# Patient Record
Sex: MALE | Race: WHITE | Employment: FULL TIME | URBAN - METROPOLITAN AREA
[De-identification: names, ages, dates, MRNs, and addresses within clinical notes are randomized per-mention and may not be internally consistent; named-entity substitution may affect disease eponyms.]

---

## 2020-09-17 ENCOUNTER — HOSPITAL ENCOUNTER (OUTPATIENT)
Dept: PREADMISSION TESTING | Age: 55
Discharge: HOME | End: 2020-09-17
Payer: MEDICARE

## 2020-09-17 PROCEDURE — 87635 SARS-COV-2 COVID-19 AMP PRB: CPT

## 2020-09-18 LAB — SARS-COV-2, COV2NT: NOT DETECTED

## 2020-09-21 ENCOUNTER — ANESTHESIA EVENT (OUTPATIENT)
Dept: SURGERY | Age: 55
End: 2020-09-21
Payer: MEDICARE

## 2020-09-22 ENCOUNTER — ANESTHESIA (OUTPATIENT)
Dept: SURGERY | Age: 55
End: 2020-09-22
Payer: MEDICARE

## 2020-09-22 ENCOUNTER — HOSPITAL ENCOUNTER (OUTPATIENT)
Age: 55
Setting detail: OUTPATIENT SURGERY
Discharge: HOME | End: 2020-09-22
Attending: UROLOGY | Admitting: UROLOGY
Payer: MEDICARE

## 2020-09-22 ENCOUNTER — APPOINTMENT (OUTPATIENT)
Dept: GENERAL RADIOLOGY | Age: 55
End: 2020-09-22
Attending: UROLOGY
Payer: MEDICARE

## 2020-09-22 VITALS
RESPIRATION RATE: 12 BRPM | DIASTOLIC BLOOD PRESSURE: 84 MMHG | HEART RATE: 68 BPM | WEIGHT: 186.13 LBS | SYSTOLIC BLOOD PRESSURE: 142 MMHG | TEMPERATURE: 96.6 F | HEIGHT: 76 IN | BODY MASS INDEX: 22.67 KG/M2 | OXYGEN SATURATION: 95 %

## 2020-09-22 DIAGNOSIS — N20.0 CALCULUS OF KIDNEY: Primary | ICD-10-CM

## 2020-09-22 PROCEDURE — 74011250636 HC RX REV CODE- 250/636: Performed by: UROLOGY

## 2020-09-22 PROCEDURE — C1758 CATHETER, URETERAL: HCPCS | Performed by: UROLOGY

## 2020-09-22 PROCEDURE — 77030010103 HC SEAL PRT ENDOSC OCOA -B: Performed by: UROLOGY

## 2020-09-22 PROCEDURE — 74011000250 HC RX REV CODE- 250: Performed by: NURSE ANESTHETIST, CERTIFIED REGISTERED

## 2020-09-22 PROCEDURE — 77030020782 HC GWN BAIR PAWS FLX 3M -B: Performed by: UROLOGY

## 2020-09-22 PROCEDURE — 74011250636 HC RX REV CODE- 250/636: Performed by: NURSE ANESTHETIST, CERTIFIED REGISTERED

## 2020-09-22 PROCEDURE — 77030040361 HC SLV COMPR DVT MDII -B: Performed by: UROLOGY

## 2020-09-22 PROCEDURE — 74420 UROGRAPHY RTRGR +-KUB: CPT

## 2020-09-22 PROCEDURE — 74011250636 HC RX REV CODE- 250/636: Performed by: ANESTHESIOLOGY

## 2020-09-22 PROCEDURE — 76060000032 HC ANESTHESIA 0.5 TO 1 HR: Performed by: UROLOGY

## 2020-09-22 PROCEDURE — 76210000006 HC OR PH I REC 0.5 TO 1 HR: Performed by: UROLOGY

## 2020-09-22 PROCEDURE — C1769 GUIDE WIRE: HCPCS | Performed by: UROLOGY

## 2020-09-22 PROCEDURE — C2617 STENT, NON-COR, TEM W/O DEL: HCPCS | Performed by: UROLOGY

## 2020-09-22 PROCEDURE — 2709999900 HC NON-CHARGEABLE SUPPLY: Performed by: UROLOGY

## 2020-09-22 PROCEDURE — 77030012508 HC MSK AIRWY LMA AMBU -A: Performed by: NURSE ANESTHETIST, CERTIFIED REGISTERED

## 2020-09-22 PROCEDURE — 76010000138 HC OR TIME 0.5 TO 1 HR: Performed by: UROLOGY

## 2020-09-22 PROCEDURE — 76210000021 HC REC RM PH II 0.5 TO 1 HR: Performed by: UROLOGY

## 2020-09-22 DEVICE — URETERAL STENT
Type: IMPLANTABLE DEVICE | Site: URETER | Status: FUNCTIONAL
Brand: POLARIS™ ULTRA

## 2020-09-22 RX ORDER — ONDANSETRON 2 MG/ML
INJECTION INTRAMUSCULAR; INTRAVENOUS AS NEEDED
Status: DISCONTINUED | OUTPATIENT
Start: 2020-09-22 | End: 2020-09-22 | Stop reason: HOSPADM

## 2020-09-22 RX ORDER — HYDROMORPHONE HYDROCHLORIDE 2 MG/ML
0.2 INJECTION, SOLUTION INTRAMUSCULAR; INTRAVENOUS; SUBCUTANEOUS AS NEEDED
Status: DISCONTINUED | OUTPATIENT
Start: 2020-09-22 | End: 2020-09-22 | Stop reason: HOSPADM

## 2020-09-22 RX ORDER — SODIUM CHLORIDE, SODIUM LACTATE, POTASSIUM CHLORIDE, CALCIUM CHLORIDE 600; 310; 30; 20 MG/100ML; MG/100ML; MG/100ML; MG/100ML
125 INJECTION, SOLUTION INTRAVENOUS CONTINUOUS
Status: DISCONTINUED | OUTPATIENT
Start: 2020-09-22 | End: 2020-09-22 | Stop reason: HOSPADM

## 2020-09-22 RX ORDER — LEVOFLOXACIN 5 MG/ML
500 INJECTION, SOLUTION INTRAVENOUS ONCE
Status: COMPLETED | OUTPATIENT
Start: 2020-09-22 | End: 2020-09-22

## 2020-09-22 RX ORDER — KETAMINE HYDROCHLORIDE 10 MG/ML
INJECTION, SOLUTION INTRAMUSCULAR; INTRAVENOUS AS NEEDED
Status: DISCONTINUED | OUTPATIENT
Start: 2020-09-22 | End: 2020-09-22 | Stop reason: HOSPADM

## 2020-09-22 RX ORDER — SODIUM CHLORIDE, SODIUM LACTATE, POTASSIUM CHLORIDE, CALCIUM CHLORIDE 600; 310; 30; 20 MG/100ML; MG/100ML; MG/100ML; MG/100ML
25 INJECTION, SOLUTION INTRAVENOUS CONTINUOUS
Status: DISCONTINUED | OUTPATIENT
Start: 2020-09-22 | End: 2020-09-22 | Stop reason: HOSPADM

## 2020-09-22 RX ORDER — SULFAMETHOXAZOLE AND TRIMETHOPRIM 800; 160 MG/1; MG/1
1 TABLET ORAL 2 TIMES DAILY
Qty: 10 TAB | Refills: 0 | Status: SHIPPED | OUTPATIENT
Start: 2020-09-23 | End: 2020-09-28

## 2020-09-22 RX ORDER — ONDANSETRON 2 MG/ML
4 INJECTION INTRAMUSCULAR; INTRAVENOUS ONCE
Status: DISCONTINUED | OUTPATIENT
Start: 2020-09-22 | End: 2020-09-22 | Stop reason: HOSPADM

## 2020-09-22 RX ORDER — HYDROMORPHONE HYDROCHLORIDE 2 MG/ML
0.5 INJECTION, SOLUTION INTRAMUSCULAR; INTRAVENOUS; SUBCUTANEOUS
Status: DISCONTINUED | OUTPATIENT
Start: 2020-09-22 | End: 2020-09-22 | Stop reason: HOSPADM

## 2020-09-22 RX ORDER — DIPHENHYDRAMINE HYDROCHLORIDE 50 MG/ML
12.5 INJECTION, SOLUTION INTRAMUSCULAR; INTRAVENOUS
Status: DISCONTINUED | OUTPATIENT
Start: 2020-09-22 | End: 2020-09-22 | Stop reason: HOSPADM

## 2020-09-22 RX ORDER — ALBUTEROL SULFATE 0.83 MG/ML
2.5 SOLUTION RESPIRATORY (INHALATION)
Status: DISCONTINUED | OUTPATIENT
Start: 2020-09-22 | End: 2020-09-22 | Stop reason: HOSPADM

## 2020-09-22 RX ORDER — FLUMAZENIL 0.1 MG/ML
0.2 INJECTION INTRAVENOUS
Status: DISCONTINUED | OUTPATIENT
Start: 2020-09-22 | End: 2020-09-22 | Stop reason: HOSPADM

## 2020-09-22 RX ORDER — HYDROCODONE BITARTRATE AND ACETAMINOPHEN 5; 325 MG/1; MG/1
1 TABLET ORAL AS NEEDED
Status: DISCONTINUED | OUTPATIENT
Start: 2020-09-22 | End: 2020-09-22 | Stop reason: HOSPADM

## 2020-09-22 RX ORDER — LIDOCAINE HYDROCHLORIDE 20 MG/ML
INJECTION, SOLUTION EPIDURAL; INFILTRATION; INTRACAUDAL; PERINEURAL AS NEEDED
Status: DISCONTINUED | OUTPATIENT
Start: 2020-09-22 | End: 2020-09-22 | Stop reason: HOSPADM

## 2020-09-22 RX ORDER — OXYCODONE AND ACETAMINOPHEN 5; 325 MG/1; MG/1
1 TABLET ORAL
Qty: 28 TAB | Refills: 0 | Status: SHIPPED | OUTPATIENT
Start: 2020-09-22 | End: 2020-09-29

## 2020-09-22 RX ORDER — PROPOFOL 10 MG/ML
INJECTION, EMULSION INTRAVENOUS AS NEEDED
Status: DISCONTINUED | OUTPATIENT
Start: 2020-09-22 | End: 2020-09-22 | Stop reason: HOSPADM

## 2020-09-22 RX ORDER — HYDROMORPHONE HYDROCHLORIDE 2 MG/ML
INJECTION, SOLUTION INTRAMUSCULAR; INTRAVENOUS; SUBCUTANEOUS AS NEEDED
Status: DISCONTINUED | OUTPATIENT
Start: 2020-09-22 | End: 2020-09-22

## 2020-09-22 RX ORDER — DEXAMETHASONE SODIUM PHOSPHATE 4 MG/ML
INJECTION, SOLUTION INTRA-ARTICULAR; INTRALESIONAL; INTRAMUSCULAR; INTRAVENOUS; SOFT TISSUE AS NEEDED
Status: DISCONTINUED | OUTPATIENT
Start: 2020-09-22 | End: 2020-09-22 | Stop reason: HOSPADM

## 2020-09-22 RX ORDER — MIDAZOLAM HYDROCHLORIDE 1 MG/ML
INJECTION, SOLUTION INTRAMUSCULAR; INTRAVENOUS AS NEEDED
Status: DISCONTINUED | OUTPATIENT
Start: 2020-09-22 | End: 2020-09-22 | Stop reason: HOSPADM

## 2020-09-22 RX ORDER — HYDROMORPHONE HYDROCHLORIDE 2 MG/ML
INJECTION, SOLUTION INTRAMUSCULAR; INTRAVENOUS; SUBCUTANEOUS AS NEEDED
Status: DISCONTINUED | OUTPATIENT
Start: 2020-09-22 | End: 2020-09-22 | Stop reason: HOSPADM

## 2020-09-22 RX ADMIN — SODIUM CHLORIDE, SODIUM LACTATE, POTASSIUM CHLORIDE, AND CALCIUM CHLORIDE: 600; 310; 30; 20 INJECTION, SOLUTION INTRAVENOUS at 13:32

## 2020-09-22 RX ADMIN — KETAMINE HYDROCHLORIDE 10 MG: 10 INJECTION, SOLUTION INTRAMUSCULAR; INTRAVENOUS at 13:08

## 2020-09-22 RX ADMIN — DEXAMETHASONE SODIUM PHOSPHATE 4 MG: 4 INJECTION, SOLUTION INTRAMUSCULAR; INTRAVENOUS at 13:02

## 2020-09-22 RX ADMIN — HYDROMORPHONE HYDROCHLORIDE 0.5 MG: 2 INJECTION, SOLUTION INTRAMUSCULAR; INTRAVENOUS; SUBCUTANEOUS at 13:18

## 2020-09-22 RX ADMIN — KETAMINE HYDROCHLORIDE 20 MG: 10 INJECTION, SOLUTION INTRAMUSCULAR; INTRAVENOUS at 12:55

## 2020-09-22 RX ADMIN — PROPOFOL 200 MG: 10 INJECTION, EMULSION INTRAVENOUS at 12:55

## 2020-09-22 RX ADMIN — SODIUM CHLORIDE, SODIUM LACTATE, POTASSIUM CHLORIDE, AND CALCIUM CHLORIDE 125 ML/HR: 600; 310; 30; 20 INJECTION, SOLUTION INTRAVENOUS at 11:58

## 2020-09-22 RX ADMIN — SODIUM CHLORIDE, SODIUM LACTATE, POTASSIUM CHLORIDE, AND CALCIUM CHLORIDE: 600; 310; 30; 20 INJECTION, SOLUTION INTRAVENOUS at 12:49

## 2020-09-22 RX ADMIN — HYDROMORPHONE HYDROCHLORIDE 0.2 MG: 2 INJECTION INTRAMUSCULAR; INTRAVENOUS; SUBCUTANEOUS at 13:55

## 2020-09-22 RX ADMIN — LEVOFLOXACIN 500 MG: 5 INJECTION, SOLUTION INTRAVENOUS at 12:49

## 2020-09-22 RX ADMIN — ONDANSETRON HYDROCHLORIDE 4 MG: 2 INJECTION INTRAMUSCULAR; INTRAVENOUS at 13:30

## 2020-09-22 RX ADMIN — HYDROMORPHONE HYDROCHLORIDE 0.2 MG: 2 INJECTION INTRAMUSCULAR; INTRAVENOUS; SUBCUTANEOUS at 14:15

## 2020-09-22 RX ADMIN — HYDROMORPHONE HYDROCHLORIDE 0.5 MG: 2 INJECTION, SOLUTION INTRAMUSCULAR; INTRAVENOUS; SUBCUTANEOUS at 13:09

## 2020-09-22 RX ADMIN — LIDOCAINE HYDROCHLORIDE 60 MG: 20 INJECTION, SOLUTION EPIDURAL; INFILTRATION; INTRACAUDAL; PERINEURAL at 12:55

## 2020-09-22 RX ADMIN — MIDAZOLAM 2 MG: 1 INJECTION INTRAMUSCULAR; INTRAVENOUS at 12:49

## 2020-09-22 NOTE — ANESTHESIA POSTPROCEDURE EVALUATION
Post-Anesthesia Evaluation & Assessment    Visit Vitals  /76 (BP 1 Location: Left arm, BP Patient Position: At rest)   Pulse 73   Temp (!) 35.9 °C (96.6 °F)   Resp 12   Ht 6' 4\" (1.93 m)   Wt 84.4 kg (186 lb 2 oz)   SpO2 95%   BMI 22.66 kg/m²       Nausea/Vomiting: no nausea and no vomiting    Post-operative hydration adequate. Pain score (VAS): 0    Mental status & Level of consciousness: alert and oriented x 3    Neurological status: moves all extremities, sensation grossly intact    Pulmonary status: airway patent, no supplemental oxygen required    Complications related to anesthesia: none    Patient has met all discharge requirements. Additional comments:  Procedure(s):  CYSTOSCOPY, RIGHT RETROGRADE PYELOGRAM WITH INTERPRETATION, RIGHT DIAGNOSTIC URETEROSCOPY, STENT PLACEMENT WITH C-ARM. general    <BSHSIANPOST>    INITIAL Post-op Vital signs:   Vitals Value Taken Time   /72 9/22/2020  2:35 PM   Temp 36.3 °C (97.3 °F) 9/22/2020  2:28 PM   Pulse 81 9/22/2020  2:35 PM   Resp 17 9/22/2020  2:35 PM   SpO2 100 % 9/22/2020  2:35 PM   Vitals shown include unvalidated device data.

## 2020-09-22 NOTE — ANESTHESIA PREPROCEDURE EVALUATION
Relevant Problems   No relevant active problems       Anesthetic History   No history of anesthetic complications            Review of Systems / Medical History  Patient summary reviewed, nursing notes reviewed and pertinent labs reviewed    Pulmonary          Smoker         Neuro/Psych         Psychiatric history    Comments: bipolar Cardiovascular                  Exercise tolerance: >4 METS     GI/Hepatic/Renal     GERD           Endo/Other             Other Findings              Physical Exam    Airway  Mallampati: I  TM Distance: 4 - 6 cm  Neck ROM: normal range of motion        Cardiovascular    Rhythm: regular  Rate: normal         Dental    Dentition: Edentulous     Pulmonary  Breath sounds clear to auscultation               Abdominal  GI exam deferred       Other Findings            Anesthetic Plan    ASA: 2  Anesthesia type: general          Induction: Intravenous  Anesthetic plan and risks discussed with: Patient

## 2020-09-22 NOTE — DISCHARGE INSTRUCTIONS
Patient Education        9 Things To Do If You've Been Exposed to COVID-19    Stay home. If you've been exposed, you should stay in isolation for 14 days. Don't go to school, work, or public areas. And don't use public transportation, ride-shares, or taxis unless you have no choice. Leave your home only if you need to get medical care. But call the doctor's office first so they know you're coming, and wear a cloth face cover when you go. Call your doctor. Call your doctor or other health professional to let them know that you've been exposed. They might want you to be tested, or they may have other instructions for you. If you become sick, wear a face cover when you are around other people. It can help stop the spread of the virus when you cough or sneeze. Limit contact with people in your home. If possible, stay in a separate bedroom and use a separate bathroom. Avoid contact with pets and other animals. Cover your mouth and nose with a tissue when you cough or sneeze. Then throw it in the trash right away. Wash your hands often, especially after you cough or sneeze. Use soap and water, and scrub for at least 20 seconds. If soap and water aren't available, use an alcohol-based hand . Don't share personal household items. These include bedding, towels, cups and glasses, and eating utensils. Clean and disinfect your home every day. Use household  or disinfectant wipes or sprays. Take special care to clean things that you grab with your hands. These include doorknobs, remote controls, phones, and handles on your refrigerator and microwave. And don't forget countertops, tabletops, bathrooms, and computer keyboards. Current as of: July 10, 2020               Content Version: 12.6  © 2006-2020 [employer]. Care instructions adapted under license by [employer] (which disclaims liability or warranty for this information).  If you have questions about a medical condition or this instruction, always ask your healthcare professional. [address] any warranty or liability for your use of this information. DISCHARGE SUMMARY from Nurse    PATIENT INSTRUCTIONS:    After general anesthesia or intravenous sedation, for 24 hours or while taking prescription Narcotics:  · Limit your activities  · Do not drive and operate hazardous machinery  · Do not make important personal or business decisions  · Do  not drink alcoholic beverages  · If you have not urinated within 8 hours after discharge, please contact your surgeon on call. Report the following to your surgeon:  · Excessive pain, swelling, redness or odor of or around the surgical area  · Temperature over 100.5  · Nausea and vomiting lasting longer than 4 hours or if unable to take medications  · Any signs of decreased circulation or nerve impairment to extremity: change in color, persistent  numbness, tingling, coldness or increase pain  · Any questions    What to do at Home:  [address] A POST OP CHECK UP    If you experience any of the following symptoms heavy bleeding, unable to void, fevers, severe pain, please follow up with dr Sydni Mendez    *  Please give a list of your current medications to your Primary Care Provider. *  Please update this list whenever your medications are discontinued, doses are      changed, or new medications (including over-the-counter products) are added. *  Please carry medication information at all times in case of emergency situations. These are general instructions for a healthy lifestyle:    No smoking/ No tobacco products/ Avoid exposure to second hand smoke  Surgeon General's Warning:  Quitting smoking now greatly reduces serious risk to your health.     Obesity, smoking, and sedentary lifestyle greatly increases your risk for illness    A healthy diet, regular physical exercise & weight monitoring are important for maintaining a healthy lifestyle    You may be retaining fluid if you have a history of heart failure or if you experience any of the following symptoms:  Weight gain of 3 pounds or more overnight or 5 pounds in a week, increased swelling in our hands or feet or shortness of breath while lying flat in bed. Please call your doctor as soon as you notice any of these symptoms; do not wait until your next office visit. The discharge information has been reviewed with the patient and caregiver. The patient and caregiver verbalized understanding. Discharge medications reviewed with the patient and caregiver and appropriate educational materials and side effects teaching were provided. ___________________________________________________________________________________________________________________________________No driving or operating machinery while on narcotic pain medication.       Patient armband removed and shredded

## 2020-09-22 NOTE — BRIEF OP NOTE
Brief Postoperative Note    Patient: Erna Mccauley  YOB: 1965  MRN: [id number]    Date of Procedure: 9/22/2020     Pre-Op Diagnosis: KIDNEY STONES    Post-Op Diagnosis: Same as preoperative diagnosis. Procedure(s):  CYSTOSCOPY, RIGHT DIAGNOSTIC URETEROSCOPY, STENT PLACEMENT WITH C-ARM    Surgeon(s):  Yovani Powell MD    Surgical Assistant: None    Anesthesia: General     Estimated Blood Loss (mL): Minimal    Complications: None other than a tight ureter that is tighter than 10 Fr in the mid ureter    Specimens: * No specimens in log *     Implants:   Implant Name Type Inv. Item Serial No.  Lot No. LRB No. Used Action   STENT URET 6FR L24CM PERCFLX HYDR+ DBL PGTL THRD 2 - [id number]  STENT URET 6FR L24CM PERCFLX HYDR+ DBL PGTL THRD 2  [id number] Right 1 Implanted       Drains: * No LDAs found *    Findings: very tight ureter distally, but tighter than 10 FR in the mid ureter.   I could only get the ureteroscopy up approx 3 cm proximal to the UO and then there was very blanched tissue and the ureter was tight and unforgiving    Electronically Signed by Marciano Mann MD on 9/22/2020 at 1:50 PM

## 2020-09-22 NOTE — H&P
Urology [address]  [address] [facility] [address]  Tel: [phone]  Fax: [phone]      Patient: Kurt Kearney  YOB: 1965          Completed Orders (This Visit)  Order Details Reason Side Interpretation Result Initial Treatment Date Region   URINALYSIS, AUTO, W/O SCOPE    see detail Test 1Color: yellow. Clarity: clear. Glucose: negative. Bilirubin: negative. Ketones: negative. Specific Gravity: 1.025. Blood: negative. pH: 6.0. Protein: negative. Urobilinogen: 0.2 mg/dl  Nitrite: negative. Leukocytes: negative. Assessment/Plan  # Detail Type Description    1. Assessment Kidney stone (N20.0). Patient Plan He has right sided flank pain and a stone. We discussed this may or may not be the cause of his pain but his sx's are certainly consistent with it. We discussed ESWL vs medical expulsive therapy vs URS with laser litho. He wishes to move forward with a cysto, right RPG, right URS with Holmium laser litho of his renal stone. Risk of bleeding, infection, persistent pain and need for future procedures were discussed. He expressed understanding and will proceed. Plan Orders The patient had the following order(s) completed today: URINALYSIS, AUTO, W/O SCOPE. Obtained on 09/17/2020, Interpretation: see detail, Result details: Test 1Color: yellow. Clarity: clear. Glucose: negative. Bilirubin: negative. Ketones: negative. Specific Gravity: 1.025. Blood: negative. pH: 6.0. Protein: negative. Urobilinogen: 0.2 mg/dl  Nitrite: negative. Leukocytes: negative. This 48year old male presents for Kidney and/or Ureteral Stone. History of Present Illness:  1. Kidney and/or Ureteral Stone   Onset was 9 days ago. Severity level is severe. It occurs constantly. The problem is with no change. Location of pain is right flank. The pain does not radiate. There are no aggravating factors. Relieving factors include analgesics. Associated symptoms include nausea, pain and vomiting. Pertinent negatives include chills, constipation, diarrhea and fever. Additional information: KUB (2020) -- 6mm right upper pole stone. [place] PAST MEDICAL/SURGICAL HISTORY   (Detailed)  Patient reported no relevant past medical/surgical history. PROBLEM LIST:   Problem List reviewed. Medications (active prior to today)  Medication Name Sig Description Start Date Stop Date Refilled Rx Elsewhere   Serophene take 1 tablet by oral route  every day //   Y   trazodone take 1 Tablet by Oral route  every bedtime //   Y   hydroxyzine HCl 50 mg tablet take 2 (100MG)  by oral route  every 4 hours //   Y     Medication Reconciliation  Medications reconciled today. Medication Reviewed  Adherence Medication Name Sig Desc Elsewhere Status   taking as directed Serophene take 1 tablet by oral route  every day Y Verified   taking as directed trazodone take 1 Tablet by Oral route  every bedtime Y Verified   taking as directed hydroxyzine HCl 50 mg tablet take 2 (100MG)  by oral route  every 4 hours Y Verified     Allergies  Ingredient Reaction (Severity) Medication Name Comment   IODINE Unknown     SHELLFISH DERIVED Unknown       Reviewed, updated. Family History  (Detailed)  Relationship Family Member Name  Age at Death Condition Onset Age Cause of Death   Family h/o    Rheumatoid Arthritis     Family h/o    Crohn's Disease     Father    heart attack           Social History:  (Detailed)  Tobacco use reviewed. Preferred language is [place]. MARITAL STATUS/FAMILY/SOCIAL SUPPORT  Marital status:    Smoking status: Current every day smoker. TOBACCO SCREENING:  Patient has used tobacco.     SMOKING STATUS  Type Smoking Status Usage Per Day Years Used Pack Years Total Pack Years    Current every day smoker         ALCOHOL  There is no history of alcohol use.    CAFFEINE  The patient uses caffeine: soda - 24 oz a day.                  Review of Systems  System Neg/Pos Details   Constitutional Positive Pain. Constitutional Negative Chills and Fever. ENMT Negative Ear infections and Sore throat. Eyes Negative Blurred vision, Double vision and Eye pain. Respiratory Negative Asthma, Chronic cough, Dyspnea and Wheezing. Cardio Negative Chest pain. GI Positive Nausea, Vomiting. GI Negative Constipation, Decreased appetite and Diarrhea. Endocrine Negative Cold intolerance, Heat intolerance, Increased thirst and Weight loss. Neuro Negative Headache and Tremors. Psych Negative Anxiety and Depression. Integumentary Negative Itching skin and Rash. MS Negative Back pain and Joint pain. Hema/Lymph Negative Easy bleeding. Reproductive Negative Sexual dysfunction. Vital Signs   Height  Time ft in cm Last Measured Height Position   10:55 AM 6.0 4.00 193.04     Weight/BSA/BMI  Time lb oz kg Context BMI kg/m2 BSA m2   10:55 .00  84.368  22.64    Blood Pressure  Time BP mm/Hg Position Side Site Method Cuff Size   10:55 /77        Temperature/Pulse/Respiration  Time Temp F Temp C Temp Site Pulse/min Pattern Resp/ min   10:55 AM 97.00 36.11  93     Measured By  Time Measured by   10:55 AM [email or web address]     Physical Exam  Exam Findings Details   Constitutional Normal Well developed. Neck Exam Normal Inspection - Normal.   Respiratory Normal Inspection - Normal.   Abdomen Normal No abdominal tenderness. Genitourinary * CVA Tenderness - RT. Genitourinary Normal Penis - Normal. Urethral meatus - Normal. Scrotum - Normal. Epididymides - Normal. Lymph nodes - Normal. Testes - Normal. No suprapubic tenderness. Extremity Normal No edema. Psychiatric Normal Orientation - Oriented to time, place, person & situation. Appropriate mood and affect.          Medications (added, continued, or stopped today)  Start Date Medication Directions PRN Status PRN Reason Instruction Stop Date    hydroxyzine HCl 50 mg tablet take 2 (100MG)  by oral route  every 4 hours N      09/17/2020 Percocet 5 mg-325 mg tablet take 1 tablet by oral route  every 6 hours as needed N       Serophene take 1 tablet by oral route  every day N      09/17/2020 tamsulosin 0.4 mg capsule take 1 capsule by oral route  every day 1/2 hour following the same meal each day N       trazodone take 1 Tablet by Oral route  every bedtime N        Prescription Drug Monitoring Report: Accessed by Davy Eduardo MD on 9/17/2020 11:23:36 AM    Active Patient Care Team Members    Name Contact Agency Type Support Role Relationship Active Date Inactive Date Specialty   Bearl Fuelling   Emergency Contact Spouse          Provider:       Rafa Garces MD

## 2020-09-22 NOTE — H&P (VIEW-ONLY)
Urology [address] [facility] [address] [address] Tel: [phone] Fax: [phone] Patient: Samuel Crook YOB: 1965 Completed Orders (This Visit) Order Details Reason Side Interpretation Result Initial Treatment Date Region URINALYSIS, AUTO, W/O SCOPE    see detail Test 1Color: yellow. Clarity: clear. Glucose: negative. Bilirubin: negative. Ketones: negative. Specific Gravity: 1.025. Blood: negative. pH: 6.0. Protein: negative. Urobilinogen: 0.2 mg/dl  Nitrite: negative. Leukocytes: negative. Assessment/Plan # Detail Type Description 1. Assessment Kidney stone (N20.0). Patient Plan He has right sided flank pain and a stone. We discussed this may or may not be the cause of his pain but his sx's are certainly consistent with it. We discussed ESWL vs medical expulsive therapy vs URS with laser litho. He wishes to move forward with a cysto, right RPG, right URS with Holmium laser litho of his renal stone. Risk of bleeding, infection, persistent pain and need for future procedures were discussed. He expressed understanding and will proceed. Plan Orders The patient had the following order(s) completed today: URINALYSIS, AUTO, W/O SCOPE. Obtained on 09/17/2020, Interpretation: see detail, Result details: Test 1Color: yellow. Clarity: clear. Glucose: negative. Bilirubin: negative. Ketones: negative. Specific Gravity: 1.025. Blood: negative. pH: 6.0. Protein: negative. Urobilinogen: 0.2 mg/dl  Nitrite: negative. Leukocytes: negative. This 48year old male presents for Kidney and/or Ureteral Stone. History of Present Illness: 1. Kidney and/or Ureteral Devon Mckenzie Onset was 9 days ago. Severity level is severe. It occurs constantly. The problem is with no change. Location of pain is right flank. The pain does not radiate. There are no aggravating factors.  Relieving factors include analgesics. Associated symptoms include nausea, pain and vomiting. Pertinent negatives include chills, constipation, diarrhea and fever. Additional information: KUB (2020) -- 6mm right upper pole stone. Lissette Ra PAST MEDICAL/SURGICAL HISTORY   (Detailed) Patient reported no relevant past medical/surgical history. PROBLEM LIST:   Problem List reviewed. Medications (active prior to today) Medication Name Sig Description Start Date Stop Date Refilled Rx Elsewhere Serophene take 1 tablet by oral route  every day //   Y  
trazodone take 1 Tablet by Oral route  every bedtime //   Y  
hydroxyzine HCl 50 mg tablet take 2 (100MG)  by oral route  every 4 hours //   Y Medication Reconciliation Medications reconciled today. Medication Reviewed Adherence Medication Name Sig Desc Elsewhere Status  
taking as directed Serophene take 1 tablet by oral route  every day Y Verified  
taking as directed trazodone take 1 Tablet by Oral route  every bedtime Y Verified  
taking as directed hydroxyzine HCl 50 mg tablet take 2 (100MG)  by oral route  every 4 hours Y Verified Allergies Ingredient Reaction (Severity) Medication Name Comment IODINE Unknown SHELLFISH DERIVED Unknown Reviewed, updated. Family History  (Detailed) Relationship Family Member Name  Age at Death Condition Onset Age Cause of Death Family h/o    Rheumatoid Arthritis Family h/o    Crohn's Disease Father    heart attack Social History:  (Detailed) Tobacco use reviewed. Preferred language is [place]. MARITAL STATUS/FAMILY/SOCIAL SUPPORT Marital status:  Smoking status: Current every day smoker. TOBACCO SCREENING: 
Patient has used tobacco.  
 
SMOKING STATUS Type Smoking Status Usage Per Day Years Used Pack Years Total Pack Years Current every day smoker ALCOHOL There is no history of alcohol use. CAFFEINE The patient uses caffeine: soda - 24 oz a day. Review of Systems System Neg/Pos Details Constitutional Positive Pain. Constitutional Negative Chills and Fever. ENMT Negative Ear infections and Sore throat. Eyes Negative Blurred vision, Double vision and Eye pain. Respiratory Negative Asthma, Chronic cough, Dyspnea and Wheezing. Cardio Negative Chest pain. GI Positive Nausea, Vomiting. GI Negative Constipation, Decreased appetite and Diarrhea. Endocrine Negative Cold intolerance, Heat intolerance, Increased thirst and Weight loss. Neuro Negative Headache and Tremors. Psych Negative Anxiety and Depression. Integumentary Negative Itching skin and Rash. MS Negative Back pain and Joint pain. Hema/Lymph Negative Easy bleeding. Reproductive Negative Sexual dysfunction. Vital Signs Height Time ft in cm Last Measured Height Position 10:55 AM 6.0 4.00 193.04 Weight/BSA/BMI Time lb oz kg Context BMI kg/m2 BSA m2  
10:55 .00  84.368  22.64 Blood Pressure Time BP mm/Hg Position Side Site Method Cuff Size 10:55 /77 Temperature/Pulse/Respiration Time Temp F Temp C Temp Site Pulse/min Pattern Resp/ min 10:55 AM 97.00 36.11  93 Measured By Time Measured by  
10:55 AM Raffy Somers Physical Exam 
Exam Findings Details Constitutional Normal Well developed. Neck Exam Normal Inspection - Normal.  
Respiratory Normal Inspection - Normal.  
Abdomen Normal No abdominal tenderness. Genitourinary * CVA Tenderness - RT. Genitourinary Normal Penis - Normal. Urethral meatus - Normal. Scrotum - Normal. Epididymides - Normal. Lymph nodes - Normal. Testes - Normal. No suprapubic tenderness. Extremity Normal No edema. Psychiatric Normal Orientation - Oriented to time, place, person & situation. Appropriate mood and affect. Medications (added, continued, or stopped today) Start Date Medication Directions PRN Status PRN Reason Instruction Stop Date hydroxyzine HCl 50 mg tablet take 2 (100MG)  by oral route  every 4 hours N     
09/17/2020 Percocet 5 mg-325 mg tablet take 1 tablet by oral route  every 6 hours as needed N Serophene take 1 tablet by oral route  every day N     
09/17/2020 tamsulosin 0.4 mg capsule take 1 capsule by oral route  every day 1/2 hour following the same meal each day N     
 trazodone take 1 Tablet by Oral route  every bedtime N     
 
Prescription Drug Monitoring Report: Accessed by Mateo Knight MD on 9/17/2020 11:23:36 AM 
 
Active Patient Care Team Members Name Contact Agency Type Support Role Relationship Active Date Inactive Date Specialty Ansley Garcia   Emergency Contact Spouse Provider:  
 
 
Cornelio Berger MD

## 2020-09-22 NOTE — PERIOP NOTES
Reviewed PTA medication list with patient/caregiver and patient/caregiver denies any additional medications. Patient admits to having a responsible adult care for them for at least 24 hours after surgery.     Pharm information verified for post op RXs. Permission granted by patient for a dual skin assessment. Dual skin assessment completed by Lenny Mathews RN and Mika Sheriff

## 2020-09-23 NOTE — OP NOTES
[facility]  OPERATIVE REPORT    Name:  Molina Villatoro  MR#:   [id number]  :  1965  ACCOUNT #:  [de-identified]  DATE OF SERVICE:  2020    PREOPERATIVE DIAGNOSIS:  Kidney stone. POSTOPERATIVE DIAGNOSIS:  Kidney stone. PROCEDURES PERFORMED:  Cystoscopy, right diagnostic ureteroscopy, and stent placement. SURGEON:  Arik Gonzalez MD    ASSISTANT:  None. ANESTHESIA:  General.    COMPLICATIONS:  None. SPECIMENS REMOVED:  None. IMPLANTS:  6-Bahamian 24-cm stent. DRAINS:  None. ESTIMATED BLOOD LOSS:  Minimal.    FINDINGS:  The patient had a very tight ureter distally but by the time I got to the mid ureter, it was tighter than 10 [place]. I could only get the ureteroscope approximately 3 cm proximal to the UO and then I encountered very blanched tissue that was extremely tight and unforgiving. No stone was encountered in the distal ureter. DISPOSITION:  The patient taken to recovery in stable condition. CLINICAL NOTE:  The patient is a 51-year-old gentleman who is known to have a 6-mm renal stone. He presents for ureteroscopy and laser lithotripsy. PROCEDURE:  Preoperatively, risks and benefits of the surgery were described to the patient. The risks included but were not limited to bleeding, infection, injury to the bladder, injury to the ureter, possible need for future procedures. The patient understood the risks and signed the informed consent. The patient taken to the operative room and placed on the OR table in the supine position. He was administered general anesthetic. He was administered intravenous antibiotics. He was placed in dorsal lithotomy position, prepped and draped in the usual sterile manner. A 22-Bahamian cystoscope and sheath were then inserted transurethrally atraumatically under direct vision using a 30-degrees lens. Panendoscopy was done. Bilateral ureteral orifices were in normal anatomic position.   There were grade II-III bladder trabeculations making the trigone a little more difficult to navigate. There were no stones or tumors or areas of concern anywhere within the bladder. Next, I cannulated the right ureteral orifice with a 5-[place] open-ended ureteral catheter. Because of the shellfish allergy, I elected not to do a retrograde pyelogram and I sent a wire up the ureter and towards the kidney. The wire was left in place, and the open-ended catheter was removed. The scope was removed. I then passed a dual-lumen catheter over the wire, but it was very tight going through the distal ureter and by the time I got to the mid ureter, the wire just could not be advanced, whatsoever. Note the stone that was seen on his previous x-ray was not in that position and appeared to be up in the kidney. I then passed a sensor wire through the dual-lumen catheter and removed the dual-lumen catheter. One wire was safety wire, the other wire was a working wire. Over the working wire, I passed a flexible ureteroscope, the narrowest nondisposable one we have. I passed it through the distal ureter and got about 3 cm proximal to the UO. I removed the working wire. Proximal to this, I could not move the scope any further. The tissue was extremely tight and unforgiving. The mucosa of the ureter was extremely blanched/very white. This was consistent with a ureter that seemed somewhat tight and scarred. Because of the positioning of this very tight ureter and a longer length of it, I elected not to do a dilation. I then removed the scope slowly on the ureter. The ureter was intact and there were no stones along the length of the ureter that I saw and removed it completely. I then reinserted the cystoscope. Over the safety wire,  I passed a 6-North Korean 24-cm stent up towards the kidney. The inner workings of the wire was removed.   Fluoroscopy confirmed the proximal coil was up in the area of renal pelvis and the distal coil was noted to be in the bladder by direct vision. At this point, the bladder was emptied and the scope was removed. The patient was then revived from anesthesia and taken to Recovery in stable condition.       Ramses Miller MD      [id number]  D:  09/22/2020 13:57  T:  09/22/2020 22:16  JOB #:  [id number]

## 2020-10-07 ENCOUNTER — HOSPITAL ENCOUNTER (OUTPATIENT)
Dept: PREADMISSION TESTING | Age: 55
Discharge: HOME | End: 2020-10-07
Payer: MEDICARE

## 2020-10-07 PROCEDURE — 87635 SARS-COV-2 COVID-19 AMP PRB: CPT

## 2020-10-08 LAB — SARS-COV-2, COV2NT: NOT DETECTED

## 2020-10-12 ENCOUNTER — ANESTHESIA EVENT (OUTPATIENT)
Dept: SURGERY | Age: 55
End: 2020-10-12
Payer: MEDICARE

## 2020-10-12 RX ORDER — MAGNESIUM SULFATE 100 %
4 CRYSTALS MISCELLANEOUS AS NEEDED
Status: CANCELLED | OUTPATIENT
Start: 2020-10-12

## 2020-10-12 RX ORDER — ONDANSETRON 2 MG/ML
4 INJECTION INTRAMUSCULAR; INTRAVENOUS ONCE
Status: CANCELLED | OUTPATIENT
Start: 2020-10-12 | End: 2020-10-12

## 2020-10-12 RX ORDER — HYDROMORPHONE HYDROCHLORIDE 2 MG/ML
0.2 INJECTION, SOLUTION INTRAMUSCULAR; INTRAVENOUS; SUBCUTANEOUS
Status: CANCELLED | OUTPATIENT
Start: 2020-10-12

## 2020-10-12 RX ORDER — DEXTROSE MONOHYDRATE 100 MG/ML
125-250 INJECTION, SOLUTION INTRAVENOUS AS NEEDED
Status: CANCELLED | OUTPATIENT
Start: 2020-10-12

## 2020-10-12 RX ORDER — SODIUM CHLORIDE 9 MG/ML
125 INJECTION, SOLUTION INTRAVENOUS CONTINUOUS
Status: CANCELLED | OUTPATIENT
Start: 2020-10-12

## 2020-10-12 RX ORDER — NALOXONE HYDROCHLORIDE 0.4 MG/ML
0.1 INJECTION, SOLUTION INTRAMUSCULAR; INTRAVENOUS; SUBCUTANEOUS AS NEEDED
Status: CANCELLED | OUTPATIENT
Start: 2020-10-12

## 2020-10-12 RX ORDER — FENTANYL CITRATE 50 UG/ML
25 INJECTION, SOLUTION INTRAMUSCULAR; INTRAVENOUS AS NEEDED
Status: CANCELLED | OUTPATIENT
Start: 2020-10-12

## 2020-10-12 RX ORDER — OXYCODONE AND ACETAMINOPHEN 5; 325 MG/1; MG/1
1 TABLET ORAL AS NEEDED
Status: CANCELLED | OUTPATIENT
Start: 2020-10-12

## 2020-10-12 RX ORDER — FENTANYL CITRATE 50 UG/ML
50 INJECTION, SOLUTION INTRAMUSCULAR; INTRAVENOUS
Status: CANCELLED | OUTPATIENT
Start: 2020-10-12

## 2020-10-13 ENCOUNTER — HOSPITAL ENCOUNTER (OUTPATIENT)
Age: 55
Setting detail: OUTPATIENT SURGERY
Discharge: HOME | End: 2020-10-13
Attending: UROLOGY | Admitting: UROLOGY
Payer: MEDICARE

## 2020-10-13 ENCOUNTER — APPOINTMENT (OUTPATIENT)
Dept: GENERAL RADIOLOGY | Age: 55
End: 2020-10-13
Attending: UROLOGY
Payer: MEDICARE

## 2020-10-13 ENCOUNTER — ANESTHESIA (OUTPATIENT)
Dept: SURGERY | Age: 55
End: 2020-10-13
Payer: MEDICARE

## 2020-10-13 VITALS
BODY MASS INDEX: 21.99 KG/M2 | DIASTOLIC BLOOD PRESSURE: 86 MMHG | HEART RATE: 67 BPM | OXYGEN SATURATION: 98 % | HEIGHT: 76 IN | WEIGHT: 180.56 LBS | TEMPERATURE: 97.3 F | RESPIRATION RATE: 12 BRPM | SYSTOLIC BLOOD PRESSURE: 127 MMHG

## 2020-10-13 DIAGNOSIS — N20.0 CALCULUS OF KIDNEY: Primary | ICD-10-CM

## 2020-10-13 PROCEDURE — 77030020782 HC GWN BAIR PAWS FLX 3M -B: Performed by: UROLOGY

## 2020-10-13 PROCEDURE — 74011250636 HC RX REV CODE- 250/636: Performed by: UROLOGY

## 2020-10-13 PROCEDURE — 77030012508 HC MSK AIRWY LMA AMBU -A: Performed by: SPECIALIST

## 2020-10-13 PROCEDURE — 2709999900 HC NON-CHARGEABLE SUPPLY: Performed by: UROLOGY

## 2020-10-13 PROCEDURE — 74011250637 HC RX REV CODE- 250/637: Performed by: SPECIALIST

## 2020-10-13 PROCEDURE — 76210000021 HC REC RM PH II 0.5 TO 1 HR: Performed by: UROLOGY

## 2020-10-13 PROCEDURE — C2617 STENT, NON-COR, TEM W/O DEL: HCPCS | Performed by: UROLOGY

## 2020-10-13 PROCEDURE — 76060000032 HC ANESTHESIA 0.5 TO 1 HR: Performed by: UROLOGY

## 2020-10-13 PROCEDURE — C1758 CATHETER, URETERAL: HCPCS | Performed by: UROLOGY

## 2020-10-13 PROCEDURE — 74011250636 HC RX REV CODE- 250/636: Performed by: SPECIALIST

## 2020-10-13 PROCEDURE — 76210000063 HC OR PH I REC FIRST 0.5 HR: Performed by: UROLOGY

## 2020-10-13 PROCEDURE — 74011000258 HC RX REV CODE- 258: Performed by: ANESTHESIOLOGY

## 2020-10-13 PROCEDURE — 74011000250 HC RX REV CODE- 250: Performed by: ANESTHESIOLOGY

## 2020-10-13 PROCEDURE — 77030010103 HC SEAL PRT ENDOSC OCOA -B: Performed by: UROLOGY

## 2020-10-13 PROCEDURE — 76010000138 HC OR TIME 0.5 TO 1 HR: Performed by: UROLOGY

## 2020-10-13 PROCEDURE — 77030040361 HC SLV COMPR DVT MDII -B: Performed by: UROLOGY

## 2020-10-13 PROCEDURE — 74011250636 HC RX REV CODE- 250/636: Performed by: ANESTHESIOLOGY

## 2020-10-13 PROCEDURE — 74011250636 HC RX REV CODE- 250/636: Performed by: NURSE ANESTHETIST, CERTIFIED REGISTERED

## 2020-10-13 PROCEDURE — C1769 GUIDE WIRE: HCPCS | Performed by: UROLOGY

## 2020-10-13 DEVICE — URETERAL STENT
Type: IMPLANTABLE DEVICE | Site: URETER | Status: FUNCTIONAL
Brand: POLARIS™ ULTRA

## 2020-10-13 RX ORDER — MIDAZOLAM HYDROCHLORIDE 1 MG/ML
INJECTION, SOLUTION INTRAMUSCULAR; INTRAVENOUS AS NEEDED
Status: DISCONTINUED | OUTPATIENT
Start: 2020-10-13 | End: 2020-10-13 | Stop reason: HOSPADM

## 2020-10-13 RX ORDER — LIDOCAINE HYDROCHLORIDE 20 MG/ML
INJECTION, SOLUTION EPIDURAL; INFILTRATION; INTRACAUDAL; PERINEURAL AS NEEDED
Status: DISCONTINUED | OUTPATIENT
Start: 2020-10-13 | End: 2020-10-13 | Stop reason: HOSPADM

## 2020-10-13 RX ORDER — OXYCODONE AND ACETAMINOPHEN 5; 325 MG/1; MG/1
1 TABLET ORAL
Qty: 15 TAB | Refills: 0 | Status: SHIPPED | OUTPATIENT
Start: 2020-10-13 | End: 2020-10-17

## 2020-10-13 RX ORDER — ONDANSETRON 2 MG/ML
INJECTION INTRAMUSCULAR; INTRAVENOUS AS NEEDED
Status: DISCONTINUED | OUTPATIENT
Start: 2020-10-13 | End: 2020-10-13 | Stop reason: HOSPADM

## 2020-10-13 RX ORDER — KETAMINE HYDROCHLORIDE 10 MG/ML
INJECTION, SOLUTION INTRAMUSCULAR; INTRAVENOUS AS NEEDED
Status: DISCONTINUED | OUTPATIENT
Start: 2020-10-13 | End: 2020-10-13 | Stop reason: HOSPADM

## 2020-10-13 RX ORDER — CEFAZOLIN SODIUM/WATER 2 G/20 ML
2 SYRINGE (ML) INTRAVENOUS ONCE
Status: COMPLETED | OUTPATIENT
Start: 2020-10-13 | End: 2020-10-13

## 2020-10-13 RX ORDER — CEPHALEXIN 500 MG/1
500 CAPSULE ORAL 3 TIMES DAILY
Qty: 9 CAP | Refills: 0 | Status: SHIPPED | OUTPATIENT
Start: 2020-10-13 | End: 2020-10-16

## 2020-10-13 RX ORDER — PROPOFOL 10 MG/ML
INJECTION, EMULSION INTRAVENOUS AS NEEDED
Status: DISCONTINUED | OUTPATIENT
Start: 2020-10-13 | End: 2020-10-13 | Stop reason: HOSPADM

## 2020-10-13 RX ORDER — ACETAMINOPHEN 500 MG
1000 TABLET ORAL ONCE
Status: COMPLETED | OUTPATIENT
Start: 2020-10-13 | End: 2020-10-13

## 2020-10-13 RX ORDER — DEXAMETHASONE SODIUM PHOSPHATE 4 MG/ML
8 INJECTION, SOLUTION INTRA-ARTICULAR; INTRALESIONAL; INTRAMUSCULAR; INTRAVENOUS; SOFT TISSUE ONCE
Status: COMPLETED | OUTPATIENT
Start: 2020-10-13 | End: 2020-10-13

## 2020-10-13 RX ORDER — GLYCOPYRROLATE 0.2 MG/ML
INJECTION INTRAMUSCULAR; INTRAVENOUS AS NEEDED
Status: DISCONTINUED | OUTPATIENT
Start: 2020-10-13 | End: 2020-10-13 | Stop reason: HOSPADM

## 2020-10-13 RX ORDER — SODIUM CHLORIDE, SODIUM LACTATE, POTASSIUM CHLORIDE, CALCIUM CHLORIDE 600; 310; 30; 20 MG/100ML; MG/100ML; MG/100ML; MG/100ML
125 INJECTION, SOLUTION INTRAVENOUS CONTINUOUS
Status: DISCONTINUED | OUTPATIENT
Start: 2020-10-13 | End: 2020-10-13 | Stop reason: HOSPADM

## 2020-10-13 RX ADMIN — PHENYLEPHRINE HYDROCHLORIDE 100 MCG: 10 INJECTION INTRAVENOUS at 10:13

## 2020-10-13 RX ADMIN — PHENYLEPHRINE HYDROCHLORIDE 200 MCG: 10 INJECTION INTRAVENOUS at 10:18

## 2020-10-13 RX ADMIN — KETAMINE HYDROCHLORIDE 10 MG: 10 INJECTION, SOLUTION INTRAMUSCULAR; INTRAVENOUS at 10:32

## 2020-10-13 RX ADMIN — LIDOCAINE HYDROCHLORIDE 100 MG: 20 INJECTION, SOLUTION EPIDURAL; INFILTRATION; INTRACAUDAL; PERINEURAL at 10:04

## 2020-10-13 RX ADMIN — KETAMINE HYDROCHLORIDE 20 MG: 10 INJECTION, SOLUTION INTRAMUSCULAR; INTRAVENOUS at 09:59

## 2020-10-13 RX ADMIN — SODIUM CHLORIDE, SODIUM LACTATE, POTASSIUM CHLORIDE, AND CALCIUM CHLORIDE 125 ML/HR: 600; 310; 30; 20 INJECTION, SOLUTION INTRAVENOUS at 09:22

## 2020-10-13 RX ADMIN — GLYCOPYRROLATE 0.2 MG: 0.2 INJECTION INTRAMUSCULAR; INTRAVENOUS at 09:56

## 2020-10-13 RX ADMIN — SODIUM CHLORIDE, SODIUM LACTATE, POTASSIUM CHLORIDE, AND CALCIUM CHLORIDE 125 ML/HR: 600; 310; 30; 20 INJECTION, SOLUTION INTRAVENOUS at 10:57

## 2020-10-13 RX ADMIN — ACETAMINOPHEN 1000 MG: 500 TABLET ORAL at 09:30

## 2020-10-13 RX ADMIN — ONDANSETRON HYDROCHLORIDE 4 MG: 2 INJECTION INTRAMUSCULAR; INTRAVENOUS at 10:35

## 2020-10-13 RX ADMIN — PHENYLEPHRINE HYDROCHLORIDE 100 MCG: 10 INJECTION INTRAVENOUS at 10:11

## 2020-10-13 RX ADMIN — KETAMINE HYDROCHLORIDE 10 MG: 10 INJECTION, SOLUTION INTRAMUSCULAR; INTRAVENOUS at 10:25

## 2020-10-13 RX ADMIN — DEXAMETHASONE SODIUM PHOSPHATE 8 MG: 4 INJECTION, SOLUTION INTRAMUSCULAR; INTRAVENOUS at 09:30

## 2020-10-13 RX ADMIN — PROPOFOL 200 MG: 10 INJECTION, EMULSION INTRAVENOUS at 10:06

## 2020-10-13 RX ADMIN — MIDAZOLAM 2 MG: 1 INJECTION INTRAMUSCULAR; INTRAVENOUS at 09:56

## 2020-10-13 RX ADMIN — PHENYLEPHRINE HYDROCHLORIDE 200 MCG: 10 INJECTION INTRAVENOUS at 10:16

## 2020-10-13 RX ADMIN — Medication 2 G: at 10:00

## 2020-10-13 NOTE — INTERVAL H&P NOTE
Update History & Physical 
 
The Patient's History and Physical of September 22, 2020 was reviewed with the patient and I examined the patient. There was a change. He had a right ureteroscopy and the urter was extremely tight. Thus a stent was placed for passive dilation The surgical site was confirmed by the patient and me. Plan:  The risk, benefits, expected outcome, and alternative to the recommended procedure have been discussed with the patient. Patient understands and wants to proceed with the procedure.  
 
Electronically signed by Rena Paige MD on 10/13/2020 at 9:43 AM

## 2020-10-13 NOTE — PERIOP NOTES
Family updated on patient status. Made aware of the progression of care and patient moving on to phase 2 in about 45 minutes. Family member demonstrated understanding verbally and had no further questions.

## 2020-10-13 NOTE — DISCHARGE INSTRUCTIONS
Patient Education        9 Things To Do If You've Been Exposed to COVID-19    Stay home. If you've been exposed, you should stay in isolation for 14 days. Don't go to school, work, or public areas. And don't use public transportation, ride-shares, or taxis unless you have no choice. Leave your home only if you need to get medical care. But call the doctor's office first so they know you're coming, and wear a cloth face cover when you go. Call your doctor. Call your doctor or other health professional to let them know that you've been exposed. They might want you to be tested, or they may have other instructions for you. If you become sick, wear a face cover when you are around other people. It can help stop the spread of the virus when you cough or sneeze. Limit contact with people in your home. If possible, stay in a separate bedroom and use a separate bathroom. Avoid contact with pets and other animals. Cover your mouth and nose with a tissue when you cough or sneeze. Then throw it in the trash right away. Wash your hands often, especially after you cough or sneeze. Use soap and water, and scrub for at least 20 seconds. If soap and water aren't available, use an alcohol-based hand . Don't share personal household items. These include bedding, towels, cups and glasses, and eating utensils. Clean and disinfect your home every day. Use household  or disinfectant wipes or sprays. Take special care to clean things that you grab with your hands. These include doorknobs, remote controls, phones, and handles on your refrigerator and microwave. And don't forget countertops, tabletops, bathrooms, and computer keyboards. Current as of: July 10, 2020               Content Version: 12.6  © 2006-2020 [employer]. Care instructions adapted under license by [employer] (which disclaims liability or warranty for this information).  If you have questions about a medical condition or this instruction, always ask your healthcare professional. [address] any warranty or liability for your use of this information. DISCHARGE SUMMARY from Nurse    PATIENT INSTRUCTIONS:    After general anesthesia or intravenous sedation, for 24 hours or while taking prescription Narcotics:  · Limit your activities  · Do not drive and operate hazardous machinery  · Do not make important personal or business decisions  · Do  not drink alcoholic beverages  · If you have not urinated within 8 hours after discharge, please contact your surgeon on call. Report the following to your surgeon:  · Excessive pain, swelling, redness or odor of or around the surgical area  · Temperature over 100.5  · Nausea and vomiting lasting longer than 4 hours or if unable to take medications  · Any signs of decreased circulation or nerve impairment to extremity: change in color, persistent  numbness, tingling, coldness or increase pain  · Any questions    What to do at Home:  [address] [place] [address] A POST OP CHECK UP    If you experience any of the following symptoms heavy bleeding, unable to void, fevers, severe pain, please follow up with dr Isadora Kathleen    *  Please give a list of your current medications to your Primary Care Provider. *  Please update this list whenever your medications are discontinued, doses are      changed, or new medications (including over-the-counter products) are added. *  Please carry medication information at all times in case of emergency situations. These are general instructions for a healthy lifestyle:    No smoking/ No tobacco products/ Avoid exposure to second hand smoke  Surgeon General's Warning:  Quitting smoking now greatly reduces serious risk to your health.     Obesity, smoking, and sedentary lifestyle greatly increases your risk for illness    A healthy diet, regular physical exercise & weight monitoring are important for maintaining a healthy lifestyle    You may be retaining fluid if you have a history of heart failure or if you experience any of the following symptoms:  Weight gain of 3 pounds or more overnight or 5 pounds in a week, increased swelling in our hands or feet or shortness of breath while lying flat in bed. Please call your doctor as soon as you notice any of these symptoms; do not wait until your next office visit. The discharge information has been reviewed with the patient and caregiver. The patient and caregiver verbalized understanding. Discharge medications reviewed with the patient and caregiver and appropriate educational materials and side effects teaching were provided.   ___________________________________________________________________________________________________________________________________    Patient armband removed and shredded

## 2020-10-13 NOTE — BRIEF OP NOTE
Brief Postoperative Note    Patient: Romy Robetro  YOB: 1965  MRN: [id number]    Date of Procedure: 10/13/2020     Pre-Op Diagnosis: KIDNEY STONE    Post-Op Diagnosis: Same as preoperative diagnosis. Procedure(s):  CYSTOSCOPY, RIGHT URETEROSCOPY, LASER LITHOTRIPSY WITH HOLMIUM, STENT CHANGE WITH C-ARM    Surgeon(s):  Jayden Nuñez MD    Surgical Assistant: None    Anesthesia: General     Estimated Blood Loss (mL): Minimal    Complications: None    Specimens: * No specimens in log *     Implants:   Implant Name Type Inv. Item Serial No.  Lot No. LRB No. Used Action   STENT URET POLARIS ULTRA 5X24 -- PERCUFLEX - [id number]  STENT URET POLARIS ULTRA 5X24 -- [id number] [facility]_ [id number] Right 1 Implanted       Drains: * No LDAs found *    Findings: He had a mild bulbar stricture approx 24 fr that was dilated to 30 fr.   He had some area of bullous edema in the mid ureter consistent with a recently passed stone. No stones in the ureter. A salo's plaque in the upper pole was lasered to completion. Every calyx was entered on multiple occasions and no other stones were seen.       Electronically Signed by Merry Cornejo MD on 10/13/2020 at 10:48 AM

## 2020-10-13 NOTE — ANESTHESIA POSTPROCEDURE EVALUATION
Post-Anesthesia Evaluation and Assessment    Cardiovascular Function/Vital Signs  Visit Vitals  /85   Pulse 91   Temp 36.1 °C (97 °F)   Resp 14   Ht 6' 4\" (1.93 m)   Wt 81.9 kg (180 lb 9 oz)   SpO2 98%   BMI 21.98 kg/m²       Patient is status post Procedure(s):  CYSTOSCOPY, RIGHT URETEROSCOPY, LASER LITHOTRIPSY WITH HOLMIUM, STENT CHANGE WITH C-ARM. Nausea/Vomiting: Controlled. Postoperative hydration reviewed and adequate. Pain:  Pain Scale 1: Numeric (0 - 10) (10/13/20 1110)  Pain Intensity 1: 3(urgency) (10/13/20 1110)   Managed. Neurological Status:   Neuro (WDL): Within Defined Limits (10/13/20 0914)   At baseline. Mental Status and Level of Consciousness: Baseline and stable. Pulmonary Status:   O2 Device: Room air (10/13/20 1110)   Adequate oxygenation and airway patent. Complications related to anesthesia: None    Post-anesthesia assessment completed. No concerns. Patient has met all discharge requirements.     Signed By: Cynthia Beltran MD

## 2020-10-13 NOTE — PERIOP NOTES
Intake/Output Summary (Last 24 hours) at 10/13/2020 1125  Last data filed at 10/13/2020 1057  Gross per 24 hour   Intake 1200 ml   Output 2 ml   Net 1198 ml

## 2020-10-13 NOTE — PERIOP NOTES
Reviewed PTA medication list with patient/caregiver and patient/caregiver denies any additional medications. Patient admits to having a responsible adult care for them at home for at least 24 hours after surgery. Patient encouraged to use gown warming system and informed that using said warming gown to regulate body temperature prior to a procedure has been shown to help reduce the risks of blood clots and infection. Patient's pharmacy of choice verified and documented in PTA medication section. Dual skin assessment & fall risk band verification completed with A Susana HIGHTOWER.

## 2020-10-13 NOTE — ANESTHESIA PREPROCEDURE EVALUATION
Relevant Problems   No relevant active problems       Anesthetic History   No history of anesthetic complications            Review of Systems / Medical History  Patient summary reviewed, nursing notes reviewed and pertinent labs reviewed    Pulmonary          Smoker         Neuro/Psych         Psychiatric history     Cardiovascular  Within defined limits                Exercise tolerance: >4 METS     GI/Hepatic/Renal     GERD: well controlled           Endo/Other  Within defined limits           Other Findings            Physical Exam    Airway  Mallampati: III  TM Distance: < 4 cm  Neck ROM: normal range of motion   Mouth opening: Diminished (comment)     Cardiovascular  Regular rate and rhythm,  S1 and S2 normal,  no murmur, click, rub, or gallop  Rhythm: regular  Rate: normal         Dental    Dentition: Edentulous     Pulmonary  Breath sounds clear to auscultation               Abdominal  GI exam deferred       Other Findings            Anesthetic Plan    ASA: 2  Anesthesia type: general          Induction: Intravenous  Anesthetic plan and risks discussed with: Patient Complex Repair And Xenograft Text: The defect edges were debeveled with a #15 scalpel blade.  The primary defect was closed partially with a complex linear closure.  Given the location of the defect, shape of the defect and the proximity to free margins a xenograft was deemed most appropriate to repair the remaining defect.  The graft was trimmed to fit the size of the remaining defect.  The graft was then placed in the primary defect, oriented appropriately, and sutured into place.

## 2020-10-13 NOTE — OP NOTES
[facility]  OPERATIVE REPORT    Name:  Nemo Anders  MR#:   [id number]  :  1965  ACCOUNT #:  [de-identified]  DATE OF SERVICE:  10/13/2020    PREOPERATIVE DIAGNOSIS:  Kidney stone. POSTOPERATIVE DIAGNOSIS:  Kidney stone. PROCEDURE PERFORMED:  Cystoscopy, right ureteroscopy with holmium laser lithotripsy and stent change. SURGEON:  Julian Singh MD    ASSISTANT:  None. ANESTHESIA:  General.    COMPLICATIONS:  None. SPECIMENS REMOVED:  None. IMPLANTS:  A 5-Citizen of [place] 24 cm stent. DRAINS:  None. ESTIMATED BLOOD LOSS:  Minimal.    FINDINGS:  The patient had a bulbar stricture approximately 24-Citizen of [place] that was dilated to 30-Citizen of [place]. He had some areas of bullous edema in the mid ureter consistent with a recently passed stone. No stones were seen in the ureter. There was a James's plaque in the upper pole that was lasered to completion. No other stones were seen anywhere else in the kidney. Every lan was entered on multiple occasions and no other stones were seen. DISPOSITION:  The patient taken to Recovery in stable addition. INDICATIONS:  The patient is a 56-year-old gentleman with a history of a right-sided stone who had right-sided flank pain, who had a ureteroscopy done a few weeks ago. He was found to have a very tight ureter and the scope could not be passed. A stent was placed for passive dilation. He presents for completion ureteroscopy. PROCEDURE:  Preoperatively, risks and benefits of the surgery were described to the patient. The risks include but are not limited to bleeding, infection, injury to the bladder, injury to the ureter, possible need for multiple procedures to be made stone-free. The patient understood, signed the informed consent. The patient was taken to the operating room and placed on the OR table in supine position. He was administered general anesthetic. He was administered intravenous antibiotics.   He was placed in dorsal lithotomy position, prepped and draped in usual sterile manner. A 22-Nigerian cystoscope and sheath were then inserted transurethrally atraumatically under direct vision using a 30-degree lens. There was some tightness in the bulbar urethra, but I was easily able to pass my scope through it. That tightness was thought to be approximately 24-[place]igerian. The scope was removed and, using Geoffrey dilators over a sensor wire, I dilated him up to 30-Nigerian. I then reinserted my cystoscope quite easily through the stricture and into the bladder. The prostate shows moderate enlargement. There were grade II bladder trabeculations. Bilateral ureteral orifices were in normal anatomic position. No stones, no tumors, no areas of concern were seen within the bladder. There was a stent emanating from the right ureteral orifice. The stent was grabbed using alligator forceps and then brought out through the urethral meatus. A sensor wire was passed through the stent up to the kidney. The stent was removed and the wire was confirmed to be up in the kidney by fluoroscopy. Once the stent was removed, I passed a dual-lumen catheter, passed a second sensor wire up to the kidney. The dual-lumen catheter was removed. One wire was a safety wire and the other wire was a working wire. Over the working wire I passed a flexible ureteroscope over the wire into the distal ureter. I then slowly advanced the scope up the ureter after the working wire was removed. The ureter was widely patent. There were no stones seen along the entire length of the ureter, but in the mid ureter there was an area of some bullous edema consistent with where the stone had been. No abnormalities were seen otherwise. Up in the kidney pan pyeloscopy was done. Every lan was entered on multiple occasions.   There was a James's plaque in the upper pole of lan that was lasered to completion and the fragments were extremely tiny after the plaque was lasered. No other stones were seen anywhere else within the kidney. The scope was slowly backed down the ureter. The ureter was intact and there were no stones or fragments along the entire length of the ureter. After I removed the ureteroscope, I reinserted the cystoscope. Over the safety wire I passed a 5-Romanian 24-cm stent over the wire up to the kidney. The wire was removed. Fluoroscopy confirmed the proximal coil was in the kidney and the distal coil was noted to be in the bladder by direct vision. The stent was allowed to hang on a string and the string was secured to the penile skin with Steri-Strips. The patient was then taken out of lithotomy position, revived from anesthesia, taken to Recovery in stable condition.       Tyler Grijalva MD      [id number]  D:  10/13/2020 10:57  T:  10/13/2020 11:58  JOB #:  [id number]

## (undated) DEVICE — GDWIRE 3CM FLX-TIP 0.038X150CM -- BX/5 SENSOR

## (undated) DEVICE — CYSTO PACK: Brand: MEDLINE INDUSTRIES, INC.

## (undated) DEVICE — DUAL LUMEN URETERAL CATHETER

## (undated) DEVICE — GARMENT,MEDLINE,DVT,INT,CALF,MED, GEN2: Brand: MEDLINE

## (undated) DEVICE — SOLUTION IRRIG 3000ML 0.9% SOD CHL FLX CONT 0797208] ICU MEDICAL INC]

## (undated) DEVICE — TRAP MUCUS SPECIMEN 40ML -- MEDICHOICE

## (undated) DEVICE — BAG PRESSURE INFUSION 3 W STOPCOCK 3000 CC PREMIERPRO DISP

## (undated) DEVICE — STERILE POLYISOPRENE POWDER-FREE SURGICAL GLOVES: Brand: PROTEXIS

## (undated) DEVICE — SEAL ENDOSCP INSTR 7FR BX SELF SEAL

## (undated) DEVICE — CATHETER URET L70CM OD6FR OPN END FLEXIMA

## (undated) DEVICE — DRAPE XR C ARM 41X74IN LF --

## (undated) DEVICE — STRAP,POSITIONING,KNEE/BODY,FOAM,4X60": Brand: MEDLINE